# Patient Record
Sex: FEMALE | Race: WHITE | NOT HISPANIC OR LATINO | Employment: OTHER | URBAN - METROPOLITAN AREA
[De-identification: names, ages, dates, MRNs, and addresses within clinical notes are randomized per-mention and may not be internally consistent; named-entity substitution may affect disease eponyms.]

---

## 2019-09-11 ENCOUNTER — APPOINTMENT (OUTPATIENT)
Dept: RADIOLOGY | Facility: CLINIC | Age: 84
End: 2019-09-11
Payer: MEDICARE

## 2019-09-11 ENCOUNTER — OFFICE VISIT (OUTPATIENT)
Dept: URGENT CARE | Facility: CLINIC | Age: 84
End: 2019-09-11
Payer: MEDICARE

## 2019-09-11 VITALS
TEMPERATURE: 98 F | DIASTOLIC BLOOD PRESSURE: 82 MMHG | WEIGHT: 120 LBS | SYSTOLIC BLOOD PRESSURE: 148 MMHG | OXYGEN SATURATION: 100 % | BODY MASS INDEX: 20.49 KG/M2 | HEART RATE: 78 BPM | RESPIRATION RATE: 16 BRPM | HEIGHT: 64 IN

## 2019-09-11 DIAGNOSIS — S69.91XA FINGER INJURY, RIGHT, INITIAL ENCOUNTER: ICD-10-CM

## 2019-09-11 DIAGNOSIS — S69.91XA FINGER INJURY, RIGHT, INITIAL ENCOUNTER: Primary | ICD-10-CM

## 2019-09-11 PROCEDURE — 99203 OFFICE O/P NEW LOW 30 MIN: CPT | Performed by: EMERGENCY MEDICINE

## 2019-09-11 PROCEDURE — 73140 X-RAY EXAM OF FINGER(S): CPT

## 2019-09-11 RX ORDER — LATANOPROST 50 UG/ML
SOLUTION/ DROPS OPHTHALMIC
Refills: 5 | COMMUNITY
Start: 2019-07-28

## 2019-09-11 RX ORDER — METFORMIN HYDROCHLORIDE 500 MG/1
TABLET, EXTENDED RELEASE ORAL
Refills: 3 | COMMUNITY
Start: 2019-07-28

## 2019-09-11 RX ORDER — ACETAMINOPHEN 325 MG/1
650 TABLET ORAL EVERY 6 HOURS PRN
COMMUNITY

## 2019-09-11 RX ORDER — LISINOPRIL 20 MG/1
20 TABLET ORAL 2 TIMES DAILY
Refills: 3 | COMMUNITY
Start: 2019-07-06

## 2019-09-11 RX ORDER — MIRTAZAPINE 15 MG/1
TABLET, FILM COATED ORAL
Refills: 3 | COMMUNITY
Start: 2019-06-14

## 2019-09-11 RX ORDER — QUETIAPINE FUMARATE 50 MG/1
TABLET, FILM COATED ORAL
Refills: 3 | COMMUNITY
Start: 2019-06-06

## 2019-09-11 RX ORDER — VITAMIN B COMPLEX
1 CAPSULE ORAL DAILY
COMMUNITY

## 2019-09-11 RX ORDER — CLOPIDOGREL BISULFATE 75 MG/1
75 TABLET ORAL EVERY OTHER DAY
Refills: 3 | COMMUNITY
Start: 2019-07-06

## 2019-09-11 RX ORDER — SIMVASTATIN 40 MG
40 TABLET ORAL DAILY
Refills: 3 | COMMUNITY
Start: 2019-07-06

## 2019-09-11 RX ORDER — METOPROLOL SUCCINATE 100 MG/1
100 TABLET, EXTENDED RELEASE ORAL
Refills: 1 | COMMUNITY
Start: 2019-08-27

## 2019-09-11 RX ORDER — ASPIRIN 81 MG/1
81 TABLET, CHEWABLE ORAL DAILY
COMMUNITY

## 2019-09-11 RX ORDER — PHENOL 1.4 %
AEROSOL, SPRAY (ML) MUCOUS MEMBRANE
COMMUNITY

## 2019-09-11 RX ORDER — MEMANTINE HYDROCHLORIDE 10 MG/1
10 TABLET ORAL 2 TIMES DAILY
Refills: 3 | COMMUNITY
Start: 2019-07-22

## 2019-09-11 RX ORDER — TRAZODONE HYDROCHLORIDE 50 MG/1
TABLET ORAL
Refills: 3 | COMMUNITY
Start: 2019-06-06

## 2019-09-11 NOTE — PROGRESS NOTES
[facility]        NAME: Jason Abdullahi is a 82 y o  female  : 1935    MRN: [id number]  DATE: 2019  TIME: 6:24 PM    Assessment and Plan   Finger injury, right, initial encounter [S69 [id number]]  1  Finger injury, right, initial encounter  XR finger right second digit-index    given instructions and supplies to warner tape wife's finger when they get home  Patient Instructions     Patient Instructions   Finger Sprain   WHAT YOU NEED TO KNOW:   A finger sprain happens when ligaments in your finger or thumb are stretched or torn  Ligaments are the tough tissues that connect bones  Ligaments allow your hands to grasp and pinch  DISCHARGE INSTRUCTIONS:   Return to the emergency department if:   · The skin on your injured finger looks bluish or pale (less color than normal)  · You have new weakness or numbness in your finger or thumb  It may tingle or burn  · You have a splint that you cannot adjust and it feels too tight  Contact your healthcare provider if:   · You have new or increased swelling or pain in your finger  · You have new or increased stiffness when you move your injured finger  · You have questions or concerns about your injury or treatment  Medicines:   · Pain medicine  may be given  Do not wait until the pain is severe before taking your medicine  · Take your medicine as directed  Call your healthcare provider if you think your medicines are not helping or if you have side effects  Tell him if you take vitamins, herbs, or any other medicines  Keep a written list of your medicines  Include the amounts, and when and why you take them  Bring the list or the pill bottles to follow-up visits  Care for your finger:   · Rest  your finger for at least 48 hours  Do not do activities that cause pain  Return to normal activities as directed  · Apply ice  on your finger to help decrease pain and swelling   Put crushed ice in a plastic bag and cover it with a towel  Put the ice on your injured finger or thumb every hour for 15 to 20 minutes at a time  You may need to ice the area at least 4 to 8 times each day  Ice your finger for as many days as directed  · Elevate your finger  above the level of your heart as often as you can  This will help decrease swelling and pain  You can elevate your hand by resting it on a pillow  · Use a splint or compression as directed  Compression (tight hold) helps support your finger or thumb as it heals  Tape your injured finger to the finger beside it  Severe sprains may be treated with a splint  A splint prevents your finger from moving while it heals  Ask how long you must wear the splint or tape, and how to apply them  · Do exercises as directed  You may be given gentle exercises to begin in a few days  Exercises can help decrease stiffness in your finger or thumb  Exercises also help decrease pain and swelling and improve the movement of your finger or thumb  Check with your healthcare provider before you return to your normal activities or sports  Follow up with your healthcare provider as directed:  Write down any questions you may have to ask at your follow up visits  © 2017 [facility] Information is for End User's use only and may not be sold, redistributed or otherwise used for commercial purposes  All illustrations and images included in CareNotes® are the copyrighted property of [employer]  or [employer]  The above information is an  only  It is not intended as medical advice for individual conditions or treatments  Talk to your doctor, nurse or pharmacist before following any medical regimen to see if it is safe and effective for you  Follow up with PCP in 3-5 days  Proceed to  ER if symptoms worsen      Chief Complaint     Chief Complaint   Patient presents with    Finger Injury      noticed right 4th finger is swollen and tender to touch History of Present Illness       Patient with severe dementia, noted by  to have redness and swelling of the distal right index finger today  He is unsure of whether there was an injury  Review of Systems   Review of Systems   Constitutional: Negative for activity change  Gastrointestinal: Negative for abdominal pain  Musculoskeletal: Positive for arthralgias and joint swelling  Negative for back pain, myalgias, neck pain and neck stiffness  Skin: Positive for color change  Negative for wound  Neurological: Negative for dizziness, syncope, weakness, light-headedness and headaches           Current Medications       Current Outpatient Medications:     acetaminophen (TYLENOL) 325 mg tablet, Take 650 mg by mouth every 6 (six) hours as needed for mild pain, Disp: , Rfl:     aspirin 81 mg chewable tablet, Chew 81 mg daily, Disp: , Rfl:     b complex vitamins capsule, Take 1 capsule by mouth daily, Disp: , Rfl:     calcium-vitamin D (OSCAL) 250-125 MG-UNIT per tablet, Take 1 tablet by mouth daily, Disp: , Rfl:     clopidogrel (PLAVIX) 75 mg tablet, 75 mg every other day, Disp: , Rfl: 3    latanoprost (XALATAN) 0 005 % ophthalmic solution, INSTILL 1 DROP INTO RIGHT EYE AT BEDTIME, Disp: , Rfl: 5    lisinopril (ZESTRIL) 20 mg tablet, Take 20 mg by mouth 2 (two) times a day, Disp: , Rfl: 3    Melatonin 10 MG TABS, Take by mouth, Disp: , Rfl:     memantine (NAMENDA) 10 mg tablet, Take 10 mg by mouth 2 (two) times a day, Disp: , Rfl: 3    metFORMIN (GLUCOPHAGE-XR) 500 mg 24 hr tablet, TAKE 1 TABLET BY MOUTH EVERY MORNING TAKE 2 TABLETS BY MOUTH EVERY DAY IN THE EVENING WITH SUPPER, Disp: , Rfl: 3    metoprolol succinate (TOPROL-XL) 100 mg 24 hr tablet, Take 100 mg by mouth daily at bedtime, Disp: , Rfl: 1    mirtazapine (REMERON) 15 mg tablet, TAKE 1 TABLET BY MOUTH EVERY DAY AT NIGHT, Disp: , Rfl: 3    polyethylene glycol-propylene glycol (SYSTANE) 0 4-0 3 %, every 3 (three) hours as needed, Disp: , Rfl:     QUEtiapine (SEROquel) 50 mg tablet, TAKE 1/2-1 TAB TWICE A DAY AS NEEDED AT NIGHT TIME FOR SLEEP  ALSO FOR IRRITABILITY/AGGRESSION , Disp: , Rfl: 3    simvastatin (ZOCOR) 40 mg tablet, Take 40 mg by mouth daily, Disp: , Rfl: 3    traZODone (DESYREL) 50 mg tablet, TAKE 1 TABLET BY MOUTH NIGHTLY AS NEEDED AS NEEDED, Disp: , Rfl: 3    Current Allergies     Allergies as of 09/11/2019 - Reviewed 09/11/2019   Allergen Reaction Noted    Clindamycin Diarrhea 09/11/2019    Cozaar [losartan] Chest Pain 09/11/2019    E-mycin [erythromycin] GI Intolerance 09/11/2019    Keflex [cephalexin] GI Intolerance 09/11/2019    Lactose Diarrhea 09/11/2019    Levofloxacin Cough 09/11/2019    Micardis [telmisartan] Chest Pain 09/11/2019    Bactrim [sulfamethoxazole-trimethoprim] Rash 09/11/2019    Codeine Rash 09/11/2019    Hydrocodone Rash 09/11/2019    Penicillins Rash 09/11/2019    Vioxx [rofecoxib] Rash 09/11/2019            The following portions of the patient's history were reviewed and updated as appropriate: allergies, current medications, past family history, past medical history, past social history, past surgical history and problem list      Past Medical History:   Diagnosis Date    Anxiety     Arthritis     Dementia     Diabetes mellitus ([facility] [address] [facility]     Disease of thyroid gland     Glaucoma     Hypertension     Osteoporosis     Seizures ([facility] [address] )        Past Surgical History:   Procedure Laterality Date    CATARACT EXTRACTION, BILATERAL      FIXATION KYPHOPLASTY         Family History   Family history unknown: Yes         Medications have been verified  Objective   /82   Pulse 78   Temp 98 °F (36 7 °C) (Tympanic)   Resp 16   Ht 5' 4" (1 626 m)   Wt 54 4 kg (120 lb)   SpO2 100%   BMI 20 60 kg/m²        Physical Exam     Physical Exam   Constitutional: She is oriented to person, place, and time  She appears well-developed and well-nourished     HENT:   Head: Normocephalic and atraumatic  Eyes: Pupils are equal, round, and reactive to light  Conjunctivae and EOM are normal    Neck: Normal range of motion  Neck supple  Musculoskeletal: She exhibits tenderness  Mildly erythematous and mildly swollen right index at PIP joint   Neurological: She is alert and oriented to person, place, and time  She displays normal reflexes  Skin: Skin is warm and dry  No rash noted  Psychiatric: She has a normal mood and affect  Her behavior is normal  Judgment and thought content normal    Nursing note and vitals reviewed

## 2019-09-11 NOTE — PATIENT INSTRUCTIONS
Finger Sprain   WHAT YOU NEED TO KNOW:   A finger sprain happens when ligaments in your finger or thumb are stretched or torn  Ligaments are the tough tissues that connect bones  Ligaments allow your hands to grasp and pinch  DISCHARGE INSTRUCTIONS:   Return to the emergency department if:   · The skin on your injured finger looks bluish or pale (less color than normal)  · You have new weakness or numbness in your finger or thumb  It may tingle or burn  · You have a splint that you cannot adjust and it feels too tight  Contact your healthcare provider if:   · You have new or increased swelling or pain in your finger  · You have new or increased stiffness when you move your injured finger  · You have questions or concerns about your injury or treatment  Medicines:   · Pain medicine  may be given  Do not wait until the pain is severe before taking your medicine  · Take your medicine as directed  Call your healthcare provider if you think your medicines are not helping or if you have side effects  Tell him if you take vitamins, herbs, or any other medicines  Keep a written list of your medicines  Include the amounts, and when and why you take them  Bring the list or the pill bottles to follow-up visits  Care for your finger:   · Rest  your finger for at least 48 hours  Do not do activities that cause pain  Return to normal activities as directed  · Apply ice  on your finger to help decrease pain and swelling  Put crushed ice in a plastic bag and cover it with a towel  Put the ice on your injured finger or thumb every hour for 15 to 20 minutes at a time  You may need to ice the area at least 4 to 8 times each day  Ice your finger for as many days as directed  · Elevate your finger  above the level of your heart as often as you can  This will help decrease swelling and pain  You can elevate your hand by resting it on a pillow  · Use a splint or compression as directed    Compression (tight hold) helps support your finger or thumb as it heals  Tape your injured finger to the finger beside it  Severe sprains may be treated with a splint  A splint prevents your finger from moving while it heals  Ask how long you must wear the splint or tape, and how to apply them  · Do exercises as directed  You may be given gentle exercises to begin in a few days  Exercises can help decrease stiffness in your finger or thumb  Exercises also help decrease pain and swelling and improve the movement of your finger or thumb  Check with your healthcare provider before you return to your normal activities or sports  Follow up with your healthcare provider as directed:  Write down any questions you may have to ask at your follow up visits  © 2017 [employer] [employer] Information is for End User's use only and may not be sold, redistributed or otherwise used for commercial purposes  All illustrations and images included in CareNotes® are the copyrighted property of [employer]  or [employer]  The above information is an  only  It is not intended as medical advice for individual conditions or treatments  Talk to your doctor, nurse or pharmacist before following any medical regimen to see if it is safe and effective for you